# Patient Record
(demographics unavailable — no encounter records)

---

## 2024-11-11 NOTE — DISCUSSION/SUMMARY
[FreeTextEntry1] : Anticipatory guidance and parent education given. Suspect allergic/irritant contact dermatitis to earring. Remove current earrings and replace. Discussed safe alternatives for earrings. May apply topical Hydrocortisone ointment as needed. Wash area daily with soap and water. Follow up as needed for persistent or worsening symptoms.

## 2024-11-11 NOTE — PHYSICAL EXAM
[NL] : moves all extremities x4, warm, well perfused x4 [FreeTextEntry7] : no increased work of breathing [de-identified] : small area of induration and erythema to back of left earlobe at site of piercing

## 2024-11-11 NOTE — HISTORY OF PRESENT ILLNESS
[de-identified] : redness behind earlobe [FreeTextEntry6] : 6 year old girl BIB mother with c/o redness to back of left earlobe for the past 2 weeks or so. Area is less red and appears to be improving as per mother. Pt had her ears pierced a few months ago and mother recently changed the earrings to ?gold. No pain, swelling or discharge noted. No fever. Good po/uop/bm. NO redness or rash elsewhere.

## 2025-01-06 NOTE — PHYSICAL EXAM
[NL] : moves all extremities x4, warm, well perfused x4 [FreeTextEntry3] : behind LT ear + excoriation and mild swelling behind earring post with clear drainage and mild erythema. No tenderness to palpation. RT ear with scant drainage noted behind post. No tenderness.  [de-identified] : post auricular extending to neck, papular rash, blanching

## 2025-01-06 NOTE — DISCUSSION/SUMMARY
[FreeTextEntry1] : Concern for superficial infection of the LT ear with contact dermatitis. Area cleaned in office with hydrogen peroxide of both ears. Scant bleeding noted. Reviewed w/parent wound care.   Apply mupirocin ointment to area BID x 7 days Warm compresses to area  Keep area clean and dry, cleanse with saline solution OTC hydrocortisone to area of rash Appropriate anticipatory guidance and education given; seek care if symptoms persist or worsen such as worsening swelling, drainage, pain, fever

## 2025-01-06 NOTE — HISTORY OF PRESENT ILLNESS
[FreeTextEntry6] : 7 y/o BIB mother for concerns of possible ear piercing infection. Pt with ears pierced in early August. Noted some irritation in November per parent and felt related to changing earrings. Mother changed earring back to the original but possibly for several weeks on the LT side noted some drainage and pain. No significant swelling or erythema noted of the ear. Pt afebrile. Last day or so noted rash behind LT ear. Reports tolerating PO, no emesis, normal UOP, normal bowel movements. Acting normally.

## 2025-04-14 NOTE — PHYSICAL EXAM
[Erythematous Oropharynx] : erythematous oropharynx [Enlarged Tonsils] : enlarged tonsils [NL] : warm, clear [Vesicles] : no vesicles [Exudate] : no exudate [Ulcerative Lesions] : no ulcerative lesions [Palate petechiae] : palate without petechiae [de-identified] : tonsils +2

## 2025-04-14 NOTE — HISTORY OF PRESENT ILLNESS
[de-identified] : tired, headache, diarrhea, decreased appetite [FreeTextEntry6] : BIB mother for tiredness, headache, diarrhea, decreased appetite, sore throat, cough, runny nose x2-3 days. Tylenol x3 days ago. No fever. No SOB, difficulty breathing, chest pain. No n/v/d. No headache, abdominal pain or rash. No body aches or fatigue. Good po/uop/bm. Normal sleep and activity.

## 2025-04-14 NOTE — PHYSICAL EXAM
[Erythematous Oropharynx] : erythematous oropharynx [Enlarged Tonsils] : enlarged tonsils [NL] : warm, clear [Vesicles] : no vesicles [Exudate] : no exudate [Ulcerative Lesions] : no ulcerative lesions [Palate petechiae] : palate without petechiae [de-identified] : tonsils +2

## 2025-04-14 NOTE — DISCUSSION/SUMMARY
[FreeTextEntry1] : Anticipatory guidance and parent education given. Rapid Flu negative Rapid strep test positive in office. Take oral antibiotics as prescribed. Use Tylenol or Ibuprofen as needed.  After being on antibiotics for at least 24 hours patient less likely to spread infection. Follow up as needed for persistent or worsening symptoms.

## 2025-04-14 NOTE — HISTORY OF PRESENT ILLNESS
[de-identified] : tired, headache, diarrhea, decreased appetite [FreeTextEntry6] : BIB mother for tiredness, headache, diarrhea, decreased appetite, sore throat, cough, runny nose x2-3 days. Tylenol x3 days ago. No fever. No SOB, difficulty breathing, chest pain. No n/v/d. No headache, abdominal pain or rash. No body aches or fatigue. Good po/uop/bm. Normal sleep and activity.

## 2025-05-01 NOTE — CARE PLAN
[Care Plan reviewed and provided to patient/caregiver] : Care plan reviewed and provided to patient/caregiver [Understands and communicates without difficulty] : Patient/Caregiver understands and communicates without difficulty [FreeTextEntry2] : Father would like to ensure resolution of symptoms and return patient back to school free from contagiousness.  [FreeTextEntry3] :  In order to ensure patient's symptom's resolve and is free from contagiousness patient should: 1. Complete antibiotics as prescribed.  2. May return to school and activities once 24 hours of treatment is complete and patient is fever free for 24 hours.  3. Complete full course of antibiotics.

## 2025-05-01 NOTE — HISTORY OF PRESENT ILLNESS
[FreeTextEntry6] : Pt BIB Father for c/o headache last night Pt given tylenol before bed last night Woke up this morning c/o ST, congestion and coughing and belly pain  Recent strep throat dx on 4/14 completed course of amox with resolution of sxs decreased appetite but tolerating fluids  Denies fevers, n/v/d, rashes